# Patient Record
Sex: FEMALE | Race: BLACK OR AFRICAN AMERICAN | NOT HISPANIC OR LATINO | Employment: UNEMPLOYED | URBAN - METROPOLITAN AREA
[De-identification: names, ages, dates, MRNs, and addresses within clinical notes are randomized per-mention and may not be internally consistent; named-entity substitution may affect disease eponyms.]

---

## 2017-06-12 ENCOUNTER — TELEPHONE (OUTPATIENT)
Dept: ORTHOPEDICS | Facility: CLINIC | Age: 47
End: 2017-06-12

## 2017-06-12 NOTE — TELEPHONE ENCOUNTER
----- Message from Milind Wei sent at 6/12/2017 10:04 AM CDT -----  Contact: Patient  Cyndie from [facility] called to schedule an appointment for established Patient. However when setting the appointment for the patient the next available appointment wasn't until 8/16/2017. Patient is in a lot of pain and needs to be seen ASAP. Please contact patient to schedule a sooner appointment [phone]. Please contact Cyndie at Dr[facility] Elder office [phone].

## 2017-10-11 PROBLEM — M75.40 SHOULDER IMPINGEMENT SYNDROME: Chronic | Status: ACTIVE | Noted: 2017-10-11

## 2018-03-14 ENCOUNTER — TELEPHONE (OUTPATIENT)
Dept: ADMINISTRATIVE | Facility: HOSPITAL | Age: 48
End: 2018-03-14